# Patient Record
Sex: FEMALE | Race: BLACK OR AFRICAN AMERICAN | NOT HISPANIC OR LATINO | URBAN - METROPOLITAN AREA
[De-identification: names, ages, dates, MRNs, and addresses within clinical notes are randomized per-mention and may not be internally consistent; named-entity substitution may affect disease eponyms.]

---

## 2017-08-06 ENCOUNTER — EMERGENCY (EMERGENCY)
Facility: HOSPITAL | Age: 22
LOS: 1 days | Discharge: HOME | End: 2017-08-06
Attending: EMERGENCY MEDICINE | Admitting: EMERGENCY MEDICINE
Payer: SELF-PAY

## 2017-08-06 VITALS
OXYGEN SATURATION: 99 % | HEART RATE: 82 BPM | RESPIRATION RATE: 16 BRPM | DIASTOLIC BLOOD PRESSURE: 58 MMHG | SYSTOLIC BLOOD PRESSURE: 105 MMHG

## 2017-08-06 VITALS
DIASTOLIC BLOOD PRESSURE: 67 MMHG | TEMPERATURE: 98 F | SYSTOLIC BLOOD PRESSURE: 105 MMHG | HEART RATE: 107 BPM | RESPIRATION RATE: 20 BRPM | OXYGEN SATURATION: 98 %

## 2017-08-06 PROCEDURE — 12011 RPR F/E/E/N/L/M 2.5 CM/<: CPT

## 2017-08-06 PROCEDURE — 99053 MED SERV 10PM-8AM 24 HR FAC: CPT

## 2017-08-06 PROCEDURE — 99283 EMERGENCY DEPT VISIT LOW MDM: CPT | Mod: 25

## 2017-08-06 PROCEDURE — 99282 EMERGENCY DEPT VISIT SF MDM: CPT | Mod: 25

## 2017-08-06 RX ADMIN — Medication 3 DROP(S): at 06:29

## 2017-08-06 NOTE — ED PROVIDER NOTE - MEDICAL DECISION MAKING DETAILS
No head imaging necessary given no LOC or fall, young healthy patient, no AMS. No facial tenderness or pain with eye movement to suggest facial fracture. Plan: repair wounds, dc with head injury precautions

## 2017-08-06 NOTE — ED PROCEDURE NOTE - CPROC ED LACER REPAIR DETAIL1
No foreign body/The wound was explored to base in bloodless field./All foreign material was removed.
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
## 2017-08-06 NOTE — ED PROVIDER NOTE - PHYSICAL EXAMINATION
Gen: NAD, AOx3  Head: NCAT  HEENT: PERRL, oral mucosa moist, bilateral injected conjunctiva, no pain moving eyes, swelling to left eye with 1.5 cm laceration to upper eyelid/brow area; bilateral fluorescein stain exam of eyes negative for abrasion or foreign body; no facial/sinus tenderness  Lung: CTAB, no respiratory distress  CV: rrr, no murmurs, Normal perfusion  Abd: soft, NTND, no CVA tenderness  MSK: No edema, no visible deformities, chest wall nontender, entire spine without midline tenderness and with full ROM, no paravertebral tenderness, no stepoffs or masses, negative straight leg raise bilaterally   Neuro: No focal neurologic deficits, CN intact, motor and sensation intact, no cerebellar signs   Skin: No rash   Psych: normal affect